# Patient Record
Sex: FEMALE | Race: BLACK OR AFRICAN AMERICAN
[De-identification: names, ages, dates, MRNs, and addresses within clinical notes are randomized per-mention and may not be internally consistent; named-entity substitution may affect disease eponyms.]

---

## 2022-10-12 ENCOUNTER — NURSE TRIAGE (OUTPATIENT)
Dept: OTHER | Facility: CLINIC | Age: 27
End: 2022-10-12

## 2022-10-12 NOTE — TELEPHONE ENCOUNTER
Location of patient: [address] [facility]  call from [place] at [facility] with [employer]; Patient with Red Flag Complaint requesting to establish care with [facility]. Subjective: Caller states \"I had some dizzy spells and am having headaches\"     Current Symptoms: intermittent Headache- usually begins after getting home from work. Pain located in forehead. Light sensitivity and nausea    Dizziness- has had x 2 episodes. Had been exposed to mold in her home. Onset: the week of 9-20-22 ; unchanged    Associated Symptoms: reduced activity, irritability  decreased appetite with headache    Pain Severity: 7/10; aching and throbbing( seem to be more behind ears); intermittent    Temperature: unknown     What has been tried: Ibuprofen 600 mg. LMP: NA Pregnant: No    Recommended disposition: Go to ED Now or C. Care advice provided, patient verbalizes understanding; denies any other questions or concerns; instructed to call back for any new or worsening symptoms. Patient/Caller agrees with recommended disposition; writer provided warm transfer to [facility] at [facility] for appointment scheduling    Attention Provider: Thank you for allowing me to participate in the care of your patient. The patient was connected to triage in response to information provided to the [facility]. Please do not respond through this encounter as the response is not directed to a shared pool.       Reason for Disposition   SEVERE headache, states 'worst headache' of life    Protocols used: Headache-ADULT-[place]